# Patient Record
Sex: FEMALE | Race: BLACK OR AFRICAN AMERICAN | NOT HISPANIC OR LATINO | Employment: OTHER | URBAN - METROPOLITAN AREA
[De-identification: names, ages, dates, MRNs, and addresses within clinical notes are randomized per-mention and may not be internally consistent; named-entity substitution may affect disease eponyms.]

---

## 2017-08-25 ENCOUNTER — PATIENT OUTREACH (OUTPATIENT)
Dept: ADMINISTRATIVE | Facility: HOSPITAL | Age: 82
End: 2017-08-25

## 2017-08-25 NOTE — PROGRESS NOTES
I spoke to son that stated she had moved back to [place], [place]. She is currently in the hospital and has a new PCP.

## 2018-02-08 ENCOUNTER — HISTORICAL (OUTPATIENT)
Dept: LAB | Facility: HOSPITAL | Age: 83
End: 2018-02-08

## 2018-02-08 LAB
ABS NEUT (OLG): 2.53 X10(3)/MCL (ref 2.1–9.2)
ALBUMIN SERPL-MCNC: 3.1 GM/DL (ref 3.4–5)
ALBUMIN/GLOB SERPL: 0.8 RATIO (ref 1.1–2)
ALP SERPL-CCNC: 121 UNIT/L (ref 38–126)
ALT SERPL-CCNC: 17 UNIT/L (ref 12–78)
AST SERPL-CCNC: 25 UNIT/L (ref 15–37)
BASOPHILS # BLD AUTO: 0 X10(3)/MCL (ref 0–0.2)
BASOPHILS NFR BLD AUTO: 1 %
BILIRUB SERPL-MCNC: 0.4 MG/DL (ref 0.2–1)
BILIRUBIN DIRECT+TOT PNL SERPL-MCNC: 0.2 MG/DL (ref 0–0.5)
BILIRUBIN DIRECT+TOT PNL SERPL-MCNC: 0.2 MG/DL (ref 0–0.8)
BUN SERPL-MCNC: 39 MG/DL (ref 7–18)
CALCIUM SERPL-MCNC: 8.5 MG/DL (ref 8.5–10.1)
CHLORIDE SERPL-SCNC: 108 MMOL/L (ref 98–107)
CO2 SERPL-SCNC: 24 MMOL/L (ref 21–32)
CREAT SERPL-MCNC: 2.59 MG/DL (ref 0.55–1.02)
EOSINOPHIL # BLD AUTO: 0.2 X10(3)/MCL (ref 0–0.9)
EOSINOPHIL NFR BLD AUTO: 4 %
ERYTHROCYTE [DISTWIDTH] IN BLOOD BY AUTOMATED COUNT: 14.6 % (ref 11.5–17)
GLOBULIN SER-MCNC: 4.1 GM/DL (ref 2.4–3.5)
GLUCOSE SERPL-MCNC: 156 MG/DL (ref 74–106)
HCT VFR BLD AUTO: 34.3 % (ref 37–47)
HGB BLD-MCNC: 10.7 GM/DL (ref 12–16)
LYMPHOCYTES # BLD AUTO: 1.2 X10(3)/MCL (ref 0.6–4.6)
LYMPHOCYTES NFR BLD AUTO: 28 %
MCH RBC QN AUTO: 30.1 PG (ref 27–31)
MCHC RBC AUTO-ENTMCNC: 31.2 GM/DL (ref 33–36)
MCV RBC AUTO: 96.3 FL (ref 80–94)
MONOCYTES # BLD AUTO: 0.4 X10(3)/MCL (ref 0.1–1.3)
MONOCYTES NFR BLD AUTO: 8 %
NEUTROPHILS # BLD AUTO: 2.53 X10(3)/MCL (ref 1.4–7.9)
NEUTROPHILS NFR BLD AUTO: 58 %
PLATELET # BLD AUTO: 170 X10(3)/MCL (ref 130–400)
PMV BLD AUTO: 10.2 FL (ref 9.4–12.4)
POTASSIUM SERPL-SCNC: 4.7 MMOL/L (ref 3.5–5.1)
PROT SERPL-MCNC: 7.2 GM/DL (ref 6.4–8.2)
RBC # BLD AUTO: 3.56 X10(6)/MCL (ref 4.2–5.4)
SODIUM SERPL-SCNC: 141 MMOL/L (ref 136–145)
WBC # SPEC AUTO: 4.3 X10(3)/MCL (ref 4.5–11.5)

## 2018-03-13 ENCOUNTER — HISTORICAL (OUTPATIENT)
Dept: RESPIRATORY THERAPY | Facility: HOSPITAL | Age: 83
End: 2018-03-13

## 2018-03-30 ENCOUNTER — HISTORICAL (OUTPATIENT)
Dept: ADMINISTRATIVE | Facility: HOSPITAL | Age: 83
End: 2018-03-30

## 2018-04-05 LAB
FINAL CULTURE: NORMAL
FINAL CULTURE: NORMAL

## 2018-04-30 ENCOUNTER — HISTORICAL (OUTPATIENT)
Dept: LAB | Facility: HOSPITAL | Age: 83
End: 2018-04-30

## 2018-04-30 LAB
ABS NEUT (OLG): 1.71 X10(3)/MCL (ref 2.1–9.2)
ALBUMIN SERPL-MCNC: 2 GM/DL (ref 3.4–5)
ALBUMIN/GLOB SERPL: 0.4 RATIO (ref 1.1–2)
ALP SERPL-CCNC: 117 UNIT/L (ref 38–126)
ALT SERPL-CCNC: 24 UNIT/L (ref 12–78)
AST SERPL-CCNC: 17 UNIT/L (ref 15–37)
BILIRUB SERPL-MCNC: 0.8 MG/DL (ref 0.2–1)
BILIRUBIN DIRECT+TOT PNL SERPL-MCNC: 0.1 MG/DL (ref 0–0.5)
BILIRUBIN DIRECT+TOT PNL SERPL-MCNC: 0.7 MG/DL (ref 0–0.8)
BUN SERPL-MCNC: 43 MG/DL (ref 7–18)
CALCIUM SERPL-MCNC: 8.5 MG/DL (ref 8.5–10.1)
CHLORIDE SERPL-SCNC: 111 MMOL/L (ref 98–107)
CO2 SERPL-SCNC: 24 MMOL/L (ref 21–32)
CREAT SERPL-MCNC: 1.93 MG/DL (ref 0.55–1.02)
CRP SERPL HS-MCNC: 32.8 MG/L (ref 0–3)
EOSINOPHIL NFR BLD MANUAL: 4 % (ref 0–8)
ERYTHROCYTE [DISTWIDTH] IN BLOOD BY AUTOMATED COUNT: 15.4 % (ref 11.5–17)
ERYTHROCYTE [SEDIMENTATION RATE] IN BLOOD: 102 MM/HR (ref 0–20)
GLOBULIN SER-MCNC: 4.8 GM/DL (ref 2.4–3.5)
GLUCOSE SERPL-MCNC: 136 MG/DL (ref 74–106)
HCT VFR BLD AUTO: 40.6 % (ref 37–47)
HGB BLD-MCNC: 12.5 GM/DL (ref 12–16)
LYMPHOCYTES NFR BLD MANUAL: 27 % (ref 13–40)
MCH RBC QN AUTO: 29.3 PG (ref 27–31)
MCHC RBC AUTO-ENTMCNC: 30.8 GM/DL (ref 33–36)
MCV RBC AUTO: 95.1 FL (ref 80–94)
MONOCYTES NFR BLD MANUAL: 11 % (ref 2–11)
NEUTROPHILS # BLD AUTO: 2.35 X10(3)/MCL (ref 1.4–7.9)
NEUTROPHILS NFR BLD MANUAL: 58 % (ref 47–80)
PLATELET # BLD AUTO: 209 X10(3)/MCL (ref 130–400)
PLATELET # BLD EST: ADEQUATE 10*3/UL
PMV BLD AUTO: 8.5 FL (ref 7.4–10.4)
POTASSIUM SERPL-SCNC: 4.6 MMOL/L (ref 3.5–5.1)
PROT SERPL-MCNC: 6.8 GM/DL (ref 6.4–8.2)
RBC # BLD AUTO: 4.27 X10(6)/MCL (ref 4.2–5.4)
RBC MORPH BLD: NORMAL
SODIUM SERPL-SCNC: 140 MMOL/L (ref 136–145)
WBC # SPEC AUTO: 3.7 X10(3)/MCL (ref 4.5–11.5)

## 2018-05-09 ENCOUNTER — HISTORICAL (OUTPATIENT)
Dept: LAB | Facility: HOSPITAL | Age: 83
End: 2018-05-09

## 2018-05-09 LAB
ABS NEUT (OLG): 2.47 X10(3)/MCL (ref 2.1–9.2)
ALBUMIN SERPL-MCNC: 2.4 GM/DL (ref 3.4–5)
ALBUMIN/GLOB SERPL: 0.5 {RATIO}
ALP SERPL-CCNC: 122 UNIT/L (ref 38–126)
ALT SERPL-CCNC: 13 UNIT/L (ref 12–78)
AST SERPL-CCNC: 14 UNIT/L (ref 15–37)
BASOPHILS # BLD AUTO: 0 X10(3)/MCL (ref 0–0.2)
BASOPHILS NFR BLD AUTO: 0 %
BILIRUB SERPL-MCNC: 0.4 MG/DL (ref 0.2–1)
BILIRUBIN DIRECT+TOT PNL SERPL-MCNC: 0.1 MG/DL (ref 0–0.2)
BILIRUBIN DIRECT+TOT PNL SERPL-MCNC: 0.3 MG/DL (ref 0–0.8)
BUN SERPL-MCNC: 47 MG/DL (ref 7–18)
CALCIUM SERPL-MCNC: 8.4 MG/DL (ref 8.5–10.1)
CHLORIDE SERPL-SCNC: 110 MMOL/L (ref 98–107)
CO2 SERPL-SCNC: 23 MMOL/L (ref 21–32)
CREAT SERPL-MCNC: 2.25 MG/DL (ref 0.55–1.02)
CRP SERPL HS-MCNC: 46.8 MG/L (ref 0–3)
EOSINOPHIL # BLD AUTO: 0.4 X10(3)/MCL (ref 0–0.9)
EOSINOPHIL NFR BLD AUTO: 8 %
ERYTHROCYTE [DISTWIDTH] IN BLOOD BY AUTOMATED COUNT: 15.6 % (ref 11.5–17)
ERYTHROCYTE [SEDIMENTATION RATE] IN BLOOD: 104 MM/HR (ref 0–20)
GLOBULIN SER-MCNC: 4.7 GM/DL (ref 2.4–3.5)
GLUCOSE SERPL-MCNC: 160 MG/DL (ref 74–106)
HCT VFR BLD AUTO: 28.7 % (ref 37–47)
HGB BLD-MCNC: 9 GM/DL (ref 12–16)
LYMPHOCYTES # BLD AUTO: 1.6 X10(3)/MCL (ref 0.6–4.6)
LYMPHOCYTES NFR BLD AUTO: 32 %
MCH RBC QN AUTO: 29.8 PG (ref 27–31)
MCHC RBC AUTO-ENTMCNC: 31.4 GM/DL (ref 33–36)
MCV RBC AUTO: 95 FL (ref 80–94)
MONOCYTES # BLD AUTO: 0.6 X10(3)/MCL (ref 0.1–1.3)
MONOCYTES NFR BLD AUTO: 11 %
NEUTROPHILS # BLD AUTO: 2.47 X10(3)/MCL (ref 1.4–7.9)
NEUTROPHILS NFR BLD AUTO: 48 %
PLATELET # BLD AUTO: 243 X10(3)/MCL (ref 130–400)
PMV BLD AUTO: 9 FL (ref 9.4–12.4)
POTASSIUM SERPL-SCNC: 4.7 MMOL/L (ref 3.5–5.1)
PROT SERPL-MCNC: 7.1 GM/DL (ref 6.4–8.2)
RBC # BLD AUTO: 3.02 X10(6)/MCL (ref 4.2–5.4)
SODIUM SERPL-SCNC: 141 MMOL/L (ref 136–145)
WBC # SPEC AUTO: 5.1 X10(3)/MCL (ref 4.5–11.5)

## 2018-05-14 ENCOUNTER — HISTORICAL (OUTPATIENT)
Dept: LAB | Facility: HOSPITAL | Age: 83
End: 2018-05-14

## 2018-05-14 LAB
ABS NEUT (OLG): 2.5 X10(3)/MCL (ref 2.1–9.2)
ALBUMIN SERPL-MCNC: 2.2 GM/DL (ref 3.4–5)
ALBUMIN/GLOB SERPL: 0.5 RATIO (ref 1.1–2)
ALP SERPL-CCNC: 106 UNIT/L (ref 38–126)
ALT SERPL-CCNC: 11 UNIT/L (ref 12–78)
AST SERPL-CCNC: 15 UNIT/L (ref 15–37)
BASOPHILS # BLD AUTO: 0 X10(3)/MCL (ref 0–0.2)
BASOPHILS NFR BLD AUTO: 0 %
BILIRUB SERPL-MCNC: 0.3 MG/DL (ref 0.2–1)
BILIRUBIN DIRECT+TOT PNL SERPL-MCNC: 0.1 MG/DL (ref 0–0.5)
BILIRUBIN DIRECT+TOT PNL SERPL-MCNC: 0.2 MG/DL (ref 0–0.8)
BUN SERPL-MCNC: 39 MG/DL (ref 7–18)
CALCIUM SERPL-MCNC: 8.2 MG/DL (ref 8.5–10.1)
CHLORIDE SERPL-SCNC: 112 MMOL/L (ref 98–107)
CO2 SERPL-SCNC: 18 MMOL/L (ref 21–32)
CREAT SERPL-MCNC: 2.3 MG/DL (ref 0.55–1.02)
CRP SERPL HS-MCNC: 38.4 MG/L (ref 0–3)
EOSINOPHIL # BLD AUTO: 0.3 X10(3)/MCL (ref 0–0.9)
EOSINOPHIL NFR BLD AUTO: 6 %
ERYTHROCYTE [DISTWIDTH] IN BLOOD BY AUTOMATED COUNT: 15.7 % (ref 11.5–17)
ERYTHROCYTE [SEDIMENTATION RATE] IN BLOOD: 104 MM/HR (ref 0–20)
GLOBULIN SER-MCNC: 4.8 GM/DL (ref 2.4–3.5)
GLUCOSE SERPL-MCNC: 187 MG/DL (ref 74–106)
HCT VFR BLD AUTO: 24.1 % (ref 37–47)
HGB BLD-MCNC: 7.5 GM/DL (ref 12–16)
LYMPHOCYTES # BLD AUTO: 1.9 X10(3)/MCL (ref 0.6–4.6)
LYMPHOCYTES NFR BLD AUTO: 36 %
MCH RBC QN AUTO: 29.6 PG (ref 27–31)
MCHC RBC AUTO-ENTMCNC: 31.1 GM/DL (ref 33–36)
MCV RBC AUTO: 95.3 FL (ref 80–94)
MONOCYTES # BLD AUTO: 0.4 X10(3)/MCL (ref 0.1–1.3)
MONOCYTES NFR BLD AUTO: 8 %
NEUTROPHILS # BLD AUTO: 2.5 X10(3)/MCL (ref 2.1–9.2)
NEUTROPHILS NFR BLD AUTO: 48 %
PLATELET # BLD AUTO: 217 X10(3)/MCL (ref 130–400)
PMV BLD AUTO: 9.2 FL (ref 9.4–12.4)
POTASSIUM SERPL-SCNC: 3.9 MMOL/L (ref 3.5–5.1)
PROT SERPL-MCNC: 7 GM/DL (ref 6.4–8.2)
RBC # BLD AUTO: 2.53 X10(6)/MCL (ref 4.2–5.4)
SODIUM SERPL-SCNC: 142 MMOL/L (ref 136–145)
WBC # SPEC AUTO: 5.2 X10(3)/MCL (ref 4.5–11.5)

## 2018-05-21 ENCOUNTER — HISTORICAL (OUTPATIENT)
Dept: LAB | Facility: HOSPITAL | Age: 83
End: 2018-05-21

## 2018-05-21 LAB
ABS NEUT (OLG): 2.17 X10(3)/MCL (ref 2.1–9.2)
ALBUMIN SERPL-MCNC: 2.3 GM/DL (ref 3.4–5)
ALBUMIN/GLOB SERPL: 0.5 {RATIO}
ALP SERPL-CCNC: 114 UNIT/L (ref 38–126)
ALT SERPL-CCNC: 10 UNIT/L (ref 12–78)
AST SERPL-CCNC: 14 UNIT/L (ref 15–37)
BASOPHILS NFR BLD MANUAL: 1 % (ref 0–2)
BILIRUB SERPL-MCNC: 0.3 MG/DL (ref 0.2–1)
BILIRUBIN DIRECT+TOT PNL SERPL-MCNC: 0.1 MG/DL (ref 0–0.2)
BILIRUBIN DIRECT+TOT PNL SERPL-MCNC: 0.2 MG/DL (ref 0–0.8)
BUN SERPL-MCNC: 38 MG/DL (ref 7–18)
BURR CELLS BLD QL SMEAR: 1
CALCIUM SERPL-MCNC: 8 MG/DL (ref 8.5–10.1)
CHLORIDE SERPL-SCNC: 110 MMOL/L (ref 98–107)
CO2 SERPL-SCNC: 20 MMOL/L (ref 21–32)
CREAT SERPL-MCNC: 2.75 MG/DL (ref 0.55–1.02)
CRP SERPL HS-MCNC: 58.1 MG/L (ref 0–3)
EOSINOPHIL NFR BLD MANUAL: 9 % (ref 0–8)
ERYTHROCYTE [DISTWIDTH] IN BLOOD BY AUTOMATED COUNT: 15.7 % (ref 11.5–17)
ERYTHROCYTE [SEDIMENTATION RATE] IN BLOOD: 120 MM/HR (ref 0–20)
GLOBULIN SER-MCNC: 4.6 GM/DL (ref 2.4–3.5)
GLUCOSE SERPL-MCNC: 181 MG/DL (ref 74–106)
HCT VFR BLD AUTO: 21.7 % (ref 37–47)
HGB BLD-MCNC: 6.7 GM/DL (ref 12–16)
LYMPHOCYTES NFR BLD MANUAL: 21 % (ref 13–40)
MCH RBC QN AUTO: 31 PG (ref 27–31)
MCHC RBC AUTO-ENTMCNC: 30.9 GM/DL (ref 33–36)
MCV RBC AUTO: 100.5 FL (ref 80–94)
MONOCYTES NFR BLD MANUAL: 10 % (ref 2–11)
NEUTROPHILS NFR BLD MANUAL: 59 % (ref 47–80)
PLATELET # BLD AUTO: 244 X10(3)/MCL (ref 130–400)
PLATELET # BLD EST: NORMAL 10*3/UL
PMV BLD AUTO: 9 FL (ref 7.4–10.4)
POIKILOCYTOSIS BLD QL SMEAR: 1
POTASSIUM SERPL-SCNC: 4 MMOL/L (ref 3.5–5.1)
PROT SERPL-MCNC: 6.9 GM/DL (ref 6.4–8.2)
RBC # BLD AUTO: 2.16 X10(6)/MCL (ref 4.2–5.4)
SODIUM SERPL-SCNC: 144 MMOL/L (ref 136–145)
WBC # SPEC AUTO: 4.4 X10(3)/MCL (ref 4.5–11.5)

## 2019-11-19 ENCOUNTER — HOSPITAL ENCOUNTER (OUTPATIENT)
Dept: MEDSURG UNIT | Facility: HOSPITAL | Age: 84
End: 2019-11-21
Attending: INTERNAL MEDICINE | Admitting: INTERNAL MEDICINE

## 2019-11-19 LAB
ABS NEUT (OLG): 1.53 X10(3)/MCL (ref 2.1–9.2)
ABS NEUT (OLG): 1.74 X10(3)/MCL (ref 2.1–9.2)
ALBUMIN SERPL-MCNC: 2.9 GM/DL (ref 3.4–5)
ALBUMIN/GLOB SERPL: 0.7 RATIO (ref 1.1–2)
ALP SERPL-CCNC: 131 UNIT/L (ref 45–117)
ALT SERPL-CCNC: 16 UNIT/L (ref 12–78)
ANISOCYTOSIS BLD QL SMEAR: ABNORMAL
APPEARANCE, UA: ABNORMAL
APPEARANCE, UA: ABNORMAL
AST SERPL-CCNC: 21 UNIT/L (ref 15–37)
BACTERIA #/AREA URNS AUTO: ABNORMAL /HPF
BACTERIA #/AREA URNS AUTO: ABNORMAL /HPF
BASOPHILS # BLD AUTO: 0 X10(3)/MCL (ref 0–0.2)
BASOPHILS NFR BLD AUTO: 1 %
BASOPHILS NFR BLD MANUAL: 1 %
BILIRUB SERPL-MCNC: 0.7 MG/DL (ref 0.2–1)
BILIRUB UR QL STRIP: NEGATIVE
BILIRUB UR QL STRIP: NEGATIVE
BILIRUBIN DIRECT+TOT PNL SERPL-MCNC: 0.3 MG/DL (ref 0–0.2)
BILIRUBIN DIRECT+TOT PNL SERPL-MCNC: 0.4 MG/DL
BUN SERPL-MCNC: 23 MG/DL (ref 7–18)
CALCIUM SERPL-MCNC: 8.7 MG/DL (ref 8.5–10.1)
CHLORIDE SERPL-SCNC: 114 MMOL/L (ref 98–107)
CO2 SERPL-SCNC: 26 MMOL/L (ref 21–32)
COLOR UR: ABNORMAL
COLOR UR: ABNORMAL
CREAT SERPL-MCNC: 2.3 MG/DL (ref 0.6–1.3)
CROSSMATCH INTERPRETATION: NORMAL
D DIMER PPP IA.FEU-MCNC: 1.46 MCG/ML FEU
EOSINOPHIL # BLD AUTO: 0.2 X10(3)/MCL (ref 0–0.9)
EOSINOPHIL NFR BLD AUTO: 6 %
EOSINOPHIL NFR BLD MANUAL: 3 %
ERYTHROCYTE [DISTWIDTH] IN BLOOD BY AUTOMATED COUNT: 14.6 % (ref 11.5–14.5)
ERYTHROCYTE [DISTWIDTH] IN BLOOD BY AUTOMATED COUNT: 14.7 % (ref 11.5–14.5)
GLOBULIN SER-MCNC: 4.2 GM/ML (ref 2.3–3.5)
GLUCOSE (UA): NEGATIVE
GLUCOSE (UA): NEGATIVE
GLUCOSE SERPL-MCNC: 156 MG/DL (ref 74–106)
GRANULOCYTES NFR BLD MANUAL: 65 % (ref 43–75)
HCT VFR BLD AUTO: 35.8 % (ref 35–46)
HCT VFR BLD AUTO: 37.7 % (ref 35–46)
HGB BLD-MCNC: 10.8 GM/DL (ref 12–16)
HGB BLD-MCNC: 11.2 GM/DL (ref 12–16)
HGB UR QL STRIP: 1 MG/DL
HGB UR QL STRIP: 1 MG/DL
HYALINE CASTS #/AREA URNS LPF: ABNORMAL /LPF
HYALINE CASTS #/AREA URNS LPF: ABNORMAL /LPF
HYPOCHROMIA BLD QL SMEAR: ABNORMAL
INR PPP: 1.23 (ref 0.9–1.2)
KETONES UR QL STRIP: NEGATIVE
KETONES UR QL STRIP: NEGATIVE
LEUKOCYTE ESTERASE UR QL STRIP: 250 LEU/UL
LEUKOCYTE ESTERASE UR QL STRIP: NEGATIVE
LYMPHOCYTES # BLD AUTO: 0.8 X10(3)/MCL (ref 0.6–4.6)
LYMPHOCYTES NFR BLD AUTO: 25 %
LYMPHOCYTES NFR BLD MANUAL: 19 % (ref 20.5–51.1)
LYMPHOCYTES NFR BLD MANUAL: 2 %
MCH RBC QN AUTO: 29.4 PG (ref 26–34)
MCH RBC QN AUTO: 29.8 PG (ref 26–34)
MCHC RBC AUTO-ENTMCNC: 29.7 GM/DL (ref 31–37)
MCHC RBC AUTO-ENTMCNC: 30.2 GM/DL (ref 31–37)
MCV RBC AUTO: 98.6 FL (ref 80–100)
MCV RBC AUTO: 99 FL (ref 80–100)
MONOCYTES # BLD AUTO: 0.3 X10(3)/MCL (ref 0.1–1.3)
MONOCYTES NFR BLD AUTO: 11 %
MONOCYTES NFR BLD MANUAL: 10 % (ref 2–9)
MUCOUS THREADS URNS QL MICRO: ABNORMAL
NEUTROPHILS # BLD AUTO: 1.74 X10(3)/MCL (ref 2.1–9.2)
NEUTROPHILS NFR BLD AUTO: 57 %
NITRITE UR QL STRIP: ABNORMAL
NITRITE UR QL STRIP: NEGATIVE
PH UR STRIP: 5.5 [PH] (ref 4.5–8)
PH UR STRIP: 8 [PH] (ref 4.5–8)
PLATELET # BLD AUTO: 150 X10(3)/MCL (ref 130–400)
PLATELET # BLD AUTO: 150 X10(3)/MCL (ref 130–400)
PLATELET # BLD EST: ADEQUATE 10*3/UL
PMV BLD AUTO: 10.1 FL (ref 7.4–10.4)
PMV BLD AUTO: 10.9 FL (ref 7.4–10.4)
POTASSIUM SERPL-SCNC: 4.1 MMOL/L (ref 3.5–5.1)
PRODUCT READY: NORMAL
PROT SERPL-MCNC: 7.1 GM/DL (ref 6.4–8.2)
PROT UR QL STRIP: 100 MG/DL
PROT UR QL STRIP: 100 MG/DL
PROTHROMBIN TIME: 15.4 SECOND(S) (ref 11.9–14.4)
RBC # BLD AUTO: 3.63 X10(6)/MCL (ref 4–5.2)
RBC # BLD AUTO: 3.81 X10(6)/MCL (ref 4–5.2)
RBC #/AREA URNS AUTO: >=100 /HPF
RBC #/AREA URNS AUTO: ABNORMAL /HPF
RBC MORPH BLD: ABNORMAL
SODIUM SERPL-SCNC: 144 MMOL/L (ref 136–145)
SP GR UR STRIP: 1.01 (ref 1–1.03)
SP GR UR STRIP: 1.02 (ref 1–1.03)
SQUAMOUS #/AREA URNS LPF: ABNORMAL /LPF
SQUAMOUS #/AREA URNS LPF: ABNORMAL /LPF
TROPONIN I SERPL-MCNC: 0.04 NG/ML (ref 0–0.05)
UROBILINOGEN UR STRIP-ACNC: NORMAL
UROBILINOGEN UR STRIP-ACNC: NORMAL
WBC # SPEC AUTO: 2.6 X10(3)/MCL (ref 4.5–11)
WBC # SPEC AUTO: 3 X10(3)/MCL (ref 4.5–11)
WBC #/AREA URNS AUTO: ABNORMAL /HPF
WBC #/AREA URNS AUTO: ABNORMAL /HPF

## 2019-11-20 LAB
ABS NEUT (OLG): 1.41 X10(3)/MCL (ref 2.1–9.2)
ALBUMIN SERPL-MCNC: 2.9 GM/DL (ref 3.4–5)
ALBUMIN/GLOB SERPL: 0.7 RATIO (ref 1.1–2)
ALP SERPL-CCNC: 117 UNIT/L (ref 45–117)
ALT SERPL-CCNC: 15 UNIT/L (ref 12–78)
APTT PPP: 182.9 SECOND(S) (ref 23.3–37)
APTT PPP: >200 SECOND(S) (ref 23.3–37)
AST SERPL-CCNC: 28 UNIT/L (ref 15–37)
BASOPHILS # BLD AUTO: 0 X10(3)/MCL (ref 0–0.2)
BASOPHILS NFR BLD AUTO: 1 %
BILIRUB SERPL-MCNC: 0.9 MG/DL (ref 0.2–1)
BILIRUBIN DIRECT+TOT PNL SERPL-MCNC: 0.3 MG/DL (ref 0–0.2)
BILIRUBIN DIRECT+TOT PNL SERPL-MCNC: 0.6 MG/DL
BUN SERPL-MCNC: 24 MG/DL (ref 7–18)
CALCIUM SERPL-MCNC: 8.9 MG/DL (ref 8.5–10.1)
CHLORIDE SERPL-SCNC: 116 MMOL/L (ref 98–107)
CO2 SERPL-SCNC: 23 MMOL/L (ref 21–32)
CREAT SERPL-MCNC: 2.3 MG/DL (ref 0.6–1.3)
EOSINOPHIL # BLD AUTO: 0.3 X10(3)/MCL (ref 0–0.9)
EOSINOPHIL NFR BLD AUTO: 9 %
ERYTHROCYTE [DISTWIDTH] IN BLOOD BY AUTOMATED COUNT: 14.6 % (ref 11.5–14.5)
GLOBULIN SER-MCNC: 4.1 GM/ML (ref 2.3–3.5)
GLUCOSE SERPL-MCNC: 139 MG/DL (ref 74–106)
HCT VFR BLD AUTO: 39.4 % (ref 35–46)
HGB BLD-MCNC: 11.7 GM/DL (ref 12–16)
IMM GRANULOCYTES # BLD AUTO: 0.01 10*3/UL
IMM GRANULOCYTES NFR BLD AUTO: 0 %
LYMPHOCYTES # BLD AUTO: 0.8 X10(3)/MCL (ref 0.6–4.6)
LYMPHOCYTES NFR BLD AUTO: 28 %
MCH RBC QN AUTO: 29.5 PG (ref 26–34)
MCHC RBC AUTO-ENTMCNC: 29.7 GM/DL (ref 31–37)
MCV RBC AUTO: 99.5 FL (ref 80–100)
MONOCYTES # BLD AUTO: 0.3 X10(3)/MCL (ref 0.1–1.3)
MONOCYTES NFR BLD AUTO: 12 %
NEUTROPHILS # BLD AUTO: 1.41 X10(3)/MCL (ref 2.1–9.2)
NEUTROPHILS NFR BLD AUTO: 50 %
PLATELET # BLD AUTO: 148 X10(3)/MCL (ref 130–400)
PMV BLD AUTO: 11.1 FL (ref 7.4–10.4)
POTASSIUM SERPL-SCNC: 4.2 MMOL/L (ref 3.5–5.1)
PROT SERPL-MCNC: 7 GM/DL (ref 6.4–8.2)
RBC # BLD AUTO: 3.96 X10(6)/MCL (ref 4–5.2)
SODIUM SERPL-SCNC: 144 MMOL/L (ref 136–145)
WBC # SPEC AUTO: 2.8 X10(3)/MCL (ref 4.5–11)

## 2019-11-24 LAB
FINAL CULTURE: NORMAL
FINAL CULTURE: NORMAL

## 2019-12-19 ENCOUNTER — HISTORICAL (OUTPATIENT)
Dept: SURGERY | Facility: HOSPITAL | Age: 84
End: 2019-12-19

## 2019-12-19 LAB
ABS NEUT (OLG): 1.34 X10(3)/MCL (ref 2.1–9.2)
BASOPHILS # BLD AUTO: 0 X10(3)/MCL (ref 0–0.2)
BASOPHILS NFR BLD AUTO: 1 %
BUN SERPL-MCNC: 21 MG/DL (ref 7–18)
BUN SERPL-MCNC: 21 MG/DL (ref 7–18)
CALCIUM SERPL-MCNC: 8.9 MG/DL (ref 8.5–10.1)
CHLORIDE SERPL-SCNC: 116 MMOL/L (ref 98–107)
CO2 SERPL-SCNC: 26 MMOL/L (ref 21–32)
CREAT SERPL-MCNC: 2 MG/DL (ref 0.6–1.3)
CREAT/UREA NIT SERPL: 10
EOSINOPHIL # BLD AUTO: 0.2 X10(3)/MCL (ref 0–0.9)
EOSINOPHIL NFR BLD AUTO: 8 %
ERYTHROCYTE [DISTWIDTH] IN BLOOD BY AUTOMATED COUNT: 15 % (ref 11.5–14.5)
GLUCOSE SERPL-MCNC: 94 MG/DL (ref 74–106)
HCT VFR BLD AUTO: 31.8 % (ref 35–46)
HGB BLD-MCNC: 9.6 GM/DL (ref 12–16)
IMM GRANULOCYTES # BLD AUTO: 0.01 10*3/UL
IMM GRANULOCYTES NFR BLD AUTO: 0 %
INR PPP: 1.15 (ref 0.9–1.2)
LYMPHOCYTES # BLD AUTO: 1 X10(3)/MCL (ref 0.6–4.6)
LYMPHOCYTES NFR BLD AUTO: 33 %
MCH RBC QN AUTO: 29.5 PG (ref 26–34)
MCHC RBC AUTO-ENTMCNC: 30.2 GM/DL (ref 31–37)
MCV RBC AUTO: 97.8 FL (ref 80–100)
MONOCYTES # BLD AUTO: 0.4 X10(3)/MCL (ref 0.1–1.3)
MONOCYTES NFR BLD AUTO: 12 %
NEUTROPHILS # BLD AUTO: 1.34 X10(3)/MCL (ref 2.1–9.2)
NEUTROPHILS NFR BLD AUTO: 45 %
PLATELET # BLD AUTO: 221 X10(3)/MCL (ref 130–400)
PMV BLD AUTO: 10 FL (ref 7.4–10.4)
POTASSIUM SERPL-SCNC: 4 MMOL/L (ref 3.5–5.1)
PROTHROMBIN TIME: 14.6 SECOND(S) (ref 11.9–14.4)
RBC # BLD AUTO: 3.25 X10(6)/MCL (ref 4–5.2)
SODIUM SERPL-SCNC: 146 MMOL/L (ref 136–145)
WBC # SPEC AUTO: 3 X10(3)/MCL (ref 4.5–11)

## 2019-12-23 ENCOUNTER — HISTORICAL (OUTPATIENT)
Dept: LAB | Facility: HOSPITAL | Age: 84
End: 2019-12-23

## 2019-12-23 LAB
ABS NEUT (OLG): 1.35 X10(3)/MCL (ref 2.1–9.2)
ALBUMIN SERPL-MCNC: 2.3 GM/DL (ref 3.4–5)
ALBUMIN/GLOB SERPL: 0.5 RATIO (ref 1.1–2)
ALP SERPL-CCNC: 142 UNIT/L (ref 38–126)
ALT SERPL-CCNC: 13 UNIT/L (ref 12–78)
ANISOCYTOSIS BLD QL SMEAR: 1
AST SERPL-CCNC: 18 UNIT/L (ref 15–37)
BASOPHILS # BLD AUTO: 0 X10(3)/MCL (ref 0–0.2)
BASOPHILS NFR BLD AUTO: 1 %
BILIRUB SERPL-MCNC: 0.3 MG/DL (ref 0.2–1)
BILIRUBIN DIRECT+TOT PNL SERPL-MCNC: 0.1 MG/DL (ref 0–0.8)
BILIRUBIN DIRECT+TOT PNL SERPL-MCNC: 0.2 MG/DL (ref 0–0.5)
BUN SERPL-MCNC: 17 MG/DL (ref 7–18)
CALCIUM SERPL-MCNC: 8 MG/DL (ref 8.5–10.1)
CHLORIDE SERPL-SCNC: 117 MMOL/L (ref 98–107)
CO2 SERPL-SCNC: 22 MMOL/L (ref 21–32)
CREAT SERPL-MCNC: 2.29 MG/DL (ref 0.55–1.02)
CRP SERPL HS-MCNC: 33.9 MG/L (ref 0–3)
EOSINOPHIL # BLD AUTO: 0.2 X10(3)/MCL (ref 0–0.9)
EOSINOPHIL NFR BLD AUTO: 8 %
ERYTHROCYTE [DISTWIDTH] IN BLOOD BY AUTOMATED COUNT: 15.5 % (ref 11.5–17)
ERYTHROCYTE [SEDIMENTATION RATE] IN BLOOD: 41 MM/HR (ref 0–20)
GLOBULIN SER-MCNC: 4.2 GM/DL (ref 2.4–3.5)
GLUCOSE SERPL-MCNC: 140 MG/DL (ref 74–106)
HCT VFR BLD AUTO: 29.7 % (ref 37–47)
HGB BLD-MCNC: 8.8 GM/DL (ref 12–16)
HYPOCHROMIA BLD QL SMEAR: 1
LYMPHOCYTES # BLD AUTO: 0.9 X10(3)/MCL (ref 0.6–4.6)
LYMPHOCYTES NFR BLD AUTO: 31 %
MACROCYTES BLD QL SMEAR: 1 /MCL
MCH RBC QN AUTO: 28.8 PG (ref 27–31)
MCHC RBC AUTO-ENTMCNC: 29.6 GM/DL (ref 33–36)
MCV RBC AUTO: 97.1 FL (ref 80–94)
MONOCYTES # BLD AUTO: 0.4 X10(3)/MCL (ref 0.1–1.3)
MONOCYTES NFR BLD AUTO: 15 %
NEUTROPHILS # BLD AUTO: 1.35 X10(3)/MCL (ref 2.1–9.2)
NEUTROPHILS NFR BLD AUTO: 45 %
OVALOCYTES BLD QL SMEAR: 1 (ref 0–0)
PLATELET # BLD AUTO: 204 X10(3)/MCL (ref 130–400)
PLATELET # BLD EST: ADEQUATE 10*3/UL
PMV BLD AUTO: 10.3 FL (ref 9.4–12.4)
POIKILOCYTOSIS BLD QL SMEAR: 1
POTASSIUM SERPL-SCNC: 3.9 MMOL/L (ref 3.5–5.1)
PROT SERPL-MCNC: 6.5 GM/DL (ref 6.4–8.2)
RBC # BLD AUTO: 3.06 X10(6)/MCL (ref 4.2–5.4)
RBC MORPH BLD: ABNORMAL
SODIUM SERPL-SCNC: 147 MMOL/L (ref 136–145)
WBC # SPEC AUTO: 3 X10(3)/MCL (ref 4.5–11.5)

## 2020-01-13 ENCOUNTER — HISTORICAL (OUTPATIENT)
Dept: LAB | Facility: HOSPITAL | Age: 85
End: 2020-01-13

## 2020-01-13 LAB
ABS NEUT (OLG): 1.33 X10(3)/MCL (ref 2.1–9.2)
ALBUMIN SERPL-MCNC: 2.2 GM/DL (ref 3.4–5)
ALBUMIN/GLOB SERPL: 0.5 RATIO (ref 1.1–2)
ALP SERPL-CCNC: 125 UNIT/L (ref 38–126)
ALT SERPL-CCNC: 15 UNIT/L (ref 12–78)
AST SERPL-CCNC: 22 UNIT/L (ref 15–37)
BASOPHILS # BLD AUTO: 0 X10(3)/MCL (ref 0–0.2)
BASOPHILS NFR BLD AUTO: 1 %
BILIRUB SERPL-MCNC: 0.5 MG/DL (ref 0.2–1)
BILIRUBIN DIRECT+TOT PNL SERPL-MCNC: 0.2 MG/DL (ref 0–0.8)
BILIRUBIN DIRECT+TOT PNL SERPL-MCNC: 0.3 MG/DL (ref 0–0.5)
BUN SERPL-MCNC: 49 MG/DL (ref 7–18)
CALCIUM SERPL-MCNC: 8.2 MG/DL (ref 8.5–10.1)
CHLORIDE SERPL-SCNC: 106 MMOL/L (ref 98–107)
CO2 SERPL-SCNC: 26 MMOL/L (ref 21–32)
CREAT SERPL-MCNC: 2.86 MG/DL (ref 0.55–1.02)
CRP SERPL HS-MCNC: 32.8 MG/L (ref 0–3)
EOSINOPHIL # BLD AUTO: 0.4 X10(3)/MCL (ref 0–0.9)
EOSINOPHIL NFR BLD AUTO: 11 %
ERYTHROCYTE [DISTWIDTH] IN BLOOD BY AUTOMATED COUNT: 18.4 % (ref 11.5–17)
ERYTHROCYTE [SEDIMENTATION RATE] IN BLOOD: 31 MM/HR (ref 0–20)
GLOBULIN SER-MCNC: 4.5 GM/DL (ref 2.4–3.5)
GLUCOSE SERPL-MCNC: 106 MG/DL (ref 74–106)
HCT VFR BLD AUTO: 29.5 % (ref 37–47)
HGB BLD-MCNC: 8.8 GM/DL (ref 12–16)
LYMPHOCYTES # BLD AUTO: 1.4 X10(3)/MCL (ref 0.6–4.6)
LYMPHOCYTES NFR BLD AUTO: 41 %
MCH RBC QN AUTO: 29.9 PG (ref 27–31)
MCHC RBC AUTO-ENTMCNC: 29.8 GM/DL (ref 33–36)
MCV RBC AUTO: 100.3 FL (ref 80–94)
MONOCYTES # BLD AUTO: 0.3 X10(3)/MCL (ref 0.1–1.3)
MONOCYTES NFR BLD AUTO: 9 %
NEUTROPHILS # BLD AUTO: 1.33 X10(3)/MCL (ref 2.1–9.2)
NEUTROPHILS NFR BLD AUTO: 38 %
PLATELET # BLD AUTO: 155 X10(3)/MCL (ref 130–400)
PLATELET # BLD EST: NORMAL 10*3/UL
PMV BLD AUTO: 10.7 FL (ref 7.4–10.4)
POTASSIUM SERPL-SCNC: 4.3 MMOL/L (ref 3.5–5.1)
PROT SERPL-MCNC: 6.7 GM/DL (ref 6.4–8.2)
RBC # BLD AUTO: 2.94 X10(6)/MCL (ref 4.2–5.4)
RBC MORPH BLD: NORMAL
SODIUM SERPL-SCNC: 144 MMOL/L (ref 136–145)
WBC # SPEC AUTO: 3.5 X10(3)/MCL (ref 4.5–11.5)

## 2020-01-20 ENCOUNTER — HISTORICAL (OUTPATIENT)
Dept: LAB | Facility: HOSPITAL | Age: 85
End: 2020-01-20

## 2020-01-20 LAB
ABS NEUT (OLG): 1.99 X10(3)/MCL (ref 2.1–9.2)
ALBUMIN SERPL-MCNC: 2.4 GM/DL (ref 3.4–5)
ALBUMIN/GLOB SERPL: 0.5 {RATIO}
ALP SERPL-CCNC: 139 UNIT/L (ref 38–126)
ALT SERPL-CCNC: 14 UNIT/L (ref 12–78)
AST SERPL-CCNC: 17 UNIT/L (ref 15–37)
BASOPHILS # BLD AUTO: 0 X10(3)/MCL (ref 0–0.2)
BASOPHILS NFR BLD AUTO: 1 %
BILIRUB SERPL-MCNC: 0.3 MG/DL (ref 0.2–1)
BILIRUBIN DIRECT+TOT PNL SERPL-MCNC: 0.1 MG/DL (ref 0–0.8)
BILIRUBIN DIRECT+TOT PNL SERPL-MCNC: 0.2 MG/DL (ref 0–0.2)
BUN SERPL-MCNC: 39 MG/DL (ref 7–18)
CALCIUM SERPL-MCNC: 8.1 MG/DL (ref 8.5–10.1)
CHLORIDE SERPL-SCNC: 114 MMOL/L (ref 98–107)
CO2 SERPL-SCNC: 25 MMOL/L (ref 21–32)
CREAT SERPL-MCNC: 3.05 MG/DL (ref 0.55–1.02)
CRP SERPL HS-MCNC: 43.4 MG/L (ref 0–3)
EOSINOPHIL # BLD AUTO: 0.5 X10(3)/MCL (ref 0–0.9)
EOSINOPHIL NFR BLD AUTO: 12 %
ERYTHROCYTE [DISTWIDTH] IN BLOOD BY AUTOMATED COUNT: 18 % (ref 11.5–17)
ERYTHROCYTE [SEDIMENTATION RATE] IN BLOOD: 44 MM/HR (ref 0–20)
GLOBULIN SER-MCNC: 4.7 GM/DL (ref 2.4–3.5)
GLUCOSE SERPL-MCNC: 112 MG/DL (ref 74–106)
HCT VFR BLD AUTO: 29.7 % (ref 37–47)
HGB BLD-MCNC: 8.5 GM/DL (ref 12–16)
IMM GRANULOCYTES # BLD AUTO: 0 10*3/UL
IMM GRANULOCYTES NFR BLD AUTO: 0 %
LYMPHOCYTES # BLD AUTO: 1.2 X10(3)/MCL (ref 0.6–4.6)
LYMPHOCYTES NFR BLD AUTO: 29 %
MCH RBC QN AUTO: 29.6 PG (ref 27–31)
MCHC RBC AUTO-ENTMCNC: 28.6 GM/DL (ref 33–36)
MCV RBC AUTO: 103.5 FL (ref 80–94)
MONOCYTES # BLD AUTO: 0.3 X10(3)/MCL (ref 0.1–1.3)
MONOCYTES NFR BLD AUTO: 8 %
NEUTROPHILS # BLD AUTO: 1.99 X10(3)/MCL (ref 2.1–9.2)
NEUTROPHILS NFR BLD AUTO: 49 %
PLATELET # BLD AUTO: 147 X10(3)/MCL (ref 130–400)
PMV BLD AUTO: 10.5 FL (ref 9.4–12.4)
POTASSIUM SERPL-SCNC: 5.4 MMOL/L (ref 3.5–5.1)
PROT SERPL-MCNC: 7.1 GM/DL (ref 6.4–8.2)
RBC # BLD AUTO: 2.87 X10(6)/MCL (ref 4.2–5.4)
SODIUM SERPL-SCNC: 147 MMOL/L (ref 136–145)
WBC # SPEC AUTO: 4.1 X10(3)/MCL (ref 4.5–11.5)

## 2022-04-10 ENCOUNTER — HISTORICAL (OUTPATIENT)
Dept: ADMINISTRATIVE | Facility: HOSPITAL | Age: 87
End: 2022-04-10
Payer: MEDICARE

## 2022-04-26 VITALS — SYSTOLIC BLOOD PRESSURE: 148 MMHG | DIASTOLIC BLOOD PRESSURE: 62 MMHG

## 2022-04-29 NOTE — ED PROVIDER NOTES
Patient:   Radha Patterson            MRN: [id number]            FIN: [id number]               Age:   87 years     Sex:  Female     :  1932   Associated Diagnoses:   Cellulitis; Acute exacerbation of CHF (congestive heart failure); Leg swelling   Author:   Melecio Barreto MD      Basic Information   History source: Patient, son.   Arrival mode: Private vehicle.   History limitation: None.   Provider/Visit info:   Time Seen:  Melecio Barreto MD / 2019 09:35  .   History of Present Illness   The patient presents with lower extremity swelling.  The onset was 3  weeks ago.  The course/duration of symptoms is worsening.  Type of injury: none.  Location: leg. The character of symptoms is pain, swelling and redness.  The degree at present is moderate.  The relieving factor is none.  Risk factors consist of coronary artery disease.  Therapy today: none.        Review of Systems   Constitutional symptoms:  No fever, no chills, no weakness, no fatigue.    Skin symptoms:  No jaundice, no rash.    Eye symptoms:  Vision unchanged.   Respiratory symptoms:  Shortness of breath, orthopnea, cough.    Cardiovascular symptoms:  Palpitations, No chest pain,    Gastrointestinal symptoms:  No abdominal pain, no nausea, no vomiting, no diarrhea.    Genitourinary symptoms:  No dysuria,    Musculoskeletal symptoms:  No back pain,    Neurologic symptoms:  No headache,              Additional review of systems information: All other systems reviewed and otherwise negative.      Health Status   Allergies:    Allergic Reactions (All)  Severity Not Documented  Cipro- No reactions were documented.  Mobic- No reactions were documented.  Canceled/Inactive Reactions (All)  Severity Not Documented  Aspirin- Swelling.  No Known Allergies,    Allergies (2) Active Reaction  Cipro None Documented  Mobic None Documented  .   Medications:  (Selected)   Prescriptions  Prescribed  Norvasc 5 mg oral tablet: 5 mg = 1 tab(s), Oral, Daily, #  30 tab(s), 3 Refill(s)  Plavix 75 mg oral tablet: 75 mg = 1 tab(s), Oral, Daily, # 30 tab(s), 3 Refill(s)  Zofran 4 mg oral tablet: 4 mg = 1 tab(s), Oral, q8hr, PRN PRN nausea, # 10 tab(s), 0 Refill(s)  aspirin 81 mg oral Delayed Release (EC) tablet: 81 mg = 1 tab(s), Oral, Daily, # 30 tab(s), 3 Refill(s)  nitroglycerin 0.4 mg sublingual TAB: 0.4 mg = 1 tab(s), SL, q5min, PRN PRN for chest pain, # 25 tab(s), 3 Refill(s)  Documented Medications  Documented  DONEPEZIL    TAB 5M mg = 1 tab(s), Oral, qPM  Januvia 25 mg oral tablet: 25 mg = 1 tab(s), Oral, Daily, # 30 tab(s), 0 Refill(s)  Lipitor 20 mg oral tablet: 20 mg = 1 tab(s), Oral, Daily, # 30 tab(s), 0 Refill(s)  PANTOPRAZOLE TAB 40M mg = 1 tab(s), Oral, Daily.      Past Medical/ Family/ Social History   Medical history:    Resolved  Diabetes mellitus (9N9P3Y63-N25Z-5017-57LN-2P909F422HGA):  Resolved.  Hypertension (FD72E7E4--R9Z5-X5JU1KD81V76):  Resolved.  Sinus infection (D345H413-OS4B-3F4G-U273-7C78O2N9O4Q3):  Resolved.  CHF (congestive heart failure) ([id number]-8W9O-9A65-H295936D3S27):  Resolved.  High cholesterol (BJ1EY3NB-39Z7-7628-GB6K-0Y07C7080A52):  Resolved.  HTN (hypertension) (3260NC4G-0235-6506-3809-UWN692DN2373):  Resolved.  Stress incontinence (8B425V36-X4B8-3919-10PJ-0580U6X20DN0):  Resolved on 8/3/2015 at 83 years.  Comments:  2012 CST 9:20 CST - SYSTEM  Problem automatically updated based on documentation on Genitourinary Symptoms and/or Urinary Elimination.  Arthritis (8392049):  Resolved.  Obesity (Y0303Y04-4202-9W39-O78P-F3D3512P5D5E):  Resolved.  Miscarriage (76280949):  Resolved.  cardiac stent (530975113):  Resolved.  PTCA - Percutaneous transluminal coronary angioplasty (0587156212):  Resolved.  UTI (lower urinary tract infection) (B6G26UO9-KG7Z-89C5-NSFD-2552719VSL79):  Resolved.  Lymphoma (17CP50JC-2532-881J-ZS0S-E7VYZV4168MV):  Resolved.  CAD (coronary artery disease) (9H796X72-84Z3-8183-O6N3-43L91OL384GK):   Resolved.  Cataract (032225626):  Resolved.  Ovarian cancer (873A7ZVG-U05T-54EC-1KE9-55D235BO8862):  Resolved.  AF (atrial fibrillation) (D69P77N6-KA55-7U0I-32VE-24D9I225S354):  Resolved.  UI - Urinary incontinence (5590393584):  Resolved.  DVT, lower extremity (JYVC1P5B-XR25-333Y-138X-1MD8974TL438):  Resolved.  Comments:  11/29/2014 CST 19:07 VASILIY Patiño RN, Vonda Crump.   Surgical history:    TAVR-TF (.) on 12/18/2018 at 86 Years.  Comments:  12/18/2018 9:33 Dariela Gamino RN  auto-populated from documented surgical case  Bleeder Control Gastrointestinal (None) on 5/31/2018 at 85 Years.  Comments:  5/31/2018 12:59 Donovan Griffin RN  auto-populated from documented surgical case  Colonoscopy (None) on 5/31/2018 at 85 Years.  Comments:  5/31/2018 12:59 Donovan Griffin RN  auto-populated from documented surgical case  Esophagogastroduodenoscopy on 5/30/2018 at 85 Years.  Comments:  5/30/2018 10:42 Arleth Gann RN  auto-populated from documented surgical case  Bleeder Control Gastrointestinal on 4/18/2018 at 85 Years.  Comments:  4/18/2018 16:55 Eladio Pierson RN  auto-populated from documented surgical case  Esophagogastroduodenoscopy on 4/18/2018 at 85 Years.  Comments:  4/18/2018 16:55 Eladio Pierson RN  auto-populated from documented surgical case  Bleeder Control Gastrointestional on 1/21/2017 at 84 Years.  Comments:  1/21/2017 12:29 Eladio Sargent RN  auto-populated from documented surgical case  Esophagogastroduodenoscopy on 1/21/2017 at 84 Years.  Comments:  1/21/2017 12:29 Eladio Sargent RN  auto-populated from documented surgical case  Catheter Insertion Mediport (.) on 9/17/2014 at 82 Years.  Comments:  9/17/2014 9:39 CDT - Joyce Zapata RN  auto-populated from documented surgical case  Excision Lymph Node on 8/27/2014 at 82 Years.  Comments:  8/27/2014 8:40 CESART - Gris Kaufman RN  "G.  auto-populated from documented surgical case  Cholecystectomy; (62804) in 1971 at 39 Years.  Exploratory laparotomy, exploratory celiotomy with or without biopsy(s) (separate procedure) (80215).  Comments:  12/8/2012 12:56 CST -   for" misscarriage"  Hysterectomy (600721202).  PTCA - Percutaneous transluminal coronary angioplasty (8967452205).  Placement of stent in cardiac conduit ([id number])..   Family history:    History is unknown..   Social history:    Social & Psychosocial Habits    Alcohol  12/02/2012 Risk Assessment: Denies Alcohol Use    06/13/2017  Use: Never    Employment/School  03/07/2014 Risk Assessment: Not employed or in school    Home/Environment  03/07/2014  Lives with: Children    Living situation: Home with assistance    Home equipment: Walker/Cane    Nutrition/Health  03/07/2014  Type of diet: Regular    Substance Use  12/12/2012 Risk Assessment: Denies Substance Abuse    06/13/2017  Use: Never    Tobacco  12/02/2012 Risk Assessment: Denies Tobacco Use    12/18/2018  Use: Never smoker    Patient Wants Consult For Cessation Counseling No    03/21/2019  Use: Never (less than 100 in l    Patient Wants Consult For Cessation Counseling No    06/29/2019  Use: Never (less than 100 in l    Patient Wants Consult For Cessation Counseling No    Abuse/Neglect  06/29/2019  SHX Any signs of abuse or neglect No  .   Problem list:    Active Problems (8)  Aortic stenosis, non-rheumatic   Atrial fibrillation   Bacterial cystitis   CAD - Coronary artery disease   Hyperglycemia   Hypovolemia   Knowledge deficit   Mitral valve regurgitation   .      Physical Examination               Vital Signs      No qualifying data available.   Oxygen saturation.   General:  No acute distress.   Skin:  Warm, dry.    Head:  Normocephalic.   Neck:  Supple.   Eye:  Pupils are equal, round and reactive to light.   Ears, nose, mouth and throat:  Oral mucosa moist.   Cardiovascular:  Irregularly irregular rhythm, No Regular rate " and rhythm,    Respiratory:  Lungs are clear to auscultation.   Chest wall:  No tenderness.   Back:  Nontender.   Musculoskeletal:  Normal ROM, Lower extremity: Right, calf, swelling, erythema.    Gastrointestinal:  Soft, Nontender, Non distended.    Neurological:  Alert and oriented to person, place, time, and situation, No focal neurological deficit observed.    Lymphatics:  No lymphadenopathy.   Psychiatric:  Cooperative.      Medical Decision Making   Orders  Launch Orders   Laboratory:  Troponin-I (Order): Stat collect, 11/19/2019 9:40 CST, Blood, Lab Collect, 11/19/2019 9:40 CST  BNP-Pro (Order): Stat collect, 11/19/2019 9:39 CST, Blood, Lab Collect, 11/19/2019 9:39 CST  Urinalysis with Micro UHC (Order): Stat collect, Urine, 11/19/2019 9:39 CST, Nurse collect  CMP (Order): Stat collect, 11/19/2019 9:39 CST, Blood, Lab Collect, 11/19/2019 9:39 CST  CBC w/ Auto Diff (Order): Now collect, 11/19/2019 9:39 CST, Blood, Lab Collect, 11/19/2019 9:39 CST  Patient Care:  Saline Lock Insert (Order): 11/19/2019 9:40 CST  Radiology:  US Venous Lower Extremity Right (Order): Stat, 11/19/2019 9:40 CST, Leg swelling, None, Patient Bed, Patient Has IV?, Rad Type, Schedule this test  XR Chest 1 View (Order): Stat, 11/19/2019 9:39 CST, Cough, None, Patient Bed, Patient Has IV?, Rad Type, Not Scheduled, Launch Orders   Pharmacy:  Lasix (Order): 40 mg, form: Injection, IV Push, Once, first dose 11/19/2019 11:20 CST, stop date 11/19/2019 11:20 CST, STAT  .    Results review:     No qualifying data available, All Results   11/19/2019 9:52 CST      WBC                       2.6 x10(3)/mcL  LOW                             RBC                       3.81 x10(6)/mcL  LOW                             Hgb                       11.2 gm/dL  LOW                             Hct                       37.7 %                             Platelet                  150 x10(3)/mcL                             MCV                       99.0 fL                              MCH                       29.4 pg                             MCHC                      29.7 gm/dL  LOW                             RDW                       14.6 %  HI                             MPV                       10.1 fL                             Abs Neut                  1.53 x10(3)/mcL  LOW                             Segs Man                  65 %                             Lymph Man                 19.0 %  LOW                             Monocyte Man              10 %  HI                             Eos Man                   3 %                             Basophil Man              1 %                             Abn Lymph Man             2 %  HI                             Hypochrom                 1+                             Platelet Est              Adequate                             Anisocyte                 1+                             RBC Morph                 Abnormal                             Sodium Lvl                144 mmol/L                             Potassium Lvl             4.1 mmol/L                             Chloride                  114 mmol/L  HI                             CO2                       26 mmol/L                             Calcium Lvl               8.7 mg/dL                             Glucose Lvl               156 mg/dL  HI                             BUN                       23 mg/dL  HI                             Creatinine                2.30 mg/dL  HI                             eGFR-AA                   26 mL/min  LOW                             eGFR-ANALI                  21 mL/min  LOW                             Bili Total                0.7 mg/dL                             Bili Direct               0.3 mg/dL  HI                             Bili Indirect             0.4 mg/dL                             AST                       21 unit/L                             ALT                       16 unit/L                              Alk Phos                  131 unit/L  HI                             Total Protein             7.1 gm/dL                             Albumin Lvl               2.9 gm/dL  LOW                             Globulin                  4.20 gm/mL  HI                             A/G Ratio                 0.7 ratio  LOW                             NT pro BNP.               16,296 pg/mL  HI                             Troponin-I                0.041 ng/mL    , Interpretation EKG -  afib with a rate of 87.       Reexamination/ Reevaluation   Time: 11/19/2019 11:20:00 .   Course: unchanged.   Pain status: unchanged.      Impression and Plan   Diagnosis   Cellulitis (YCW99-JA L03.90)   Acute exacerbation of CHF (congestive heart failure) (AUQ63-FJ I50.9)   Leg swelling (DVC81-RH M79.89)      Calls-Consults   -  11/19/2019 11:28:00 , IM, recommends Admission.    Plan   Disposition: Admit time  11/19/2019 11:28:00.